# Patient Record
Sex: FEMALE | URBAN - METROPOLITAN AREA
[De-identification: names, ages, dates, MRNs, and addresses within clinical notes are randomized per-mention and may not be internally consistent; named-entity substitution may affect disease eponyms.]

---

## 2018-10-10 ENCOUNTER — VIRTUAL VISIT (OUTPATIENT)
Dept: FAMILY MEDICINE | Facility: OTHER | Age: 43
End: 2018-10-10

## 2018-10-11 NOTE — PROGRESS NOTES
"Date:   Clinician: Alonso Gotti  Clinician NPI: [id number]  Patient: Lilia Vaughn  Patient : 1975  Patient Address: [address] [place] [address], [place] [address]  Patient Phone: [phone]  Visit Protocol: Yeast infection  Patient Summary:  Lilia is a 43 year old ( : 1975 ) female who initiated a Visit for a presumed vaginal yeast infection. When asked the question \"Please sign me up to receive news, health information and promotions from [employer].\", Lilia responded \"Yes\".    Lilia began noticing vaginal discharge, vaginal pruritus, and perivulvar pruritus 0-5 days ago. She has a more than normal amount of thick, clear or white, non-odorous, chunky (like cottage cheese) discharge.   She denies having abdominal pain, perivulvar rash, and open sores. She also denies feeling feverish.   Lilia has a history of vaginal yeast infections. She has had zero (0) occurrences in the past year and the current symptoms are similar to previous yeast infections. She has used fluconazole (Diflucan) to treat previous yeast infections. 2 doses of fluconazole (Diflucan) has typically been needed for symptoms to resolve in the past.  She has attempted to treat her symptoms with anti-fungal cream for yeast infections and anti-fungal suppositories for yeast infections and has used the anti-fungal medication as directed. Anti-fungal medication used to treat symptoms as reported by the patient (free text): Monistat 3   She prefers a fluconazole (Diflucan) Pill.   She denies taking antibiotics in the past 2 weeks. She denies having a sexually transmitted disease.   She denies pregnancy and denies breastfeeding. She has menstruated in the past month.   She does NOT smoke or use smokeless tobacco.    MEDICATIONS: No current medications, ALLERGIES: NKDA  Clinician Response:  Dear Lilia,  Based on the information you have provided, you likely have a vaginal yeast infection which is a common infection of the vagina caused by " a fungus.  I am prescribing:   Fluconazole (Diflucan) 150 mg oral tablet. Take 1 tablet by mouth in a single dose. Repeat dose in 3 days if symptoms are still present. There are no refills with this prescription.  While you have yeast infection symptoms, do the following:      Avoid irritants such as scented bath products, tampons, pads, or vaginal sprays and powders.    Avoid douching.    Wear cotton underwear and if you are comfortable doing so, do not wear underwear to bed.    Avoid hot tubs and whirlpool spas.     Symptoms should improve with 1-2 days of treatment and resolve entirely in 5-7 days. However, there are other types of vaginal infections that have some of the same symptoms as a yeast infection. For this reason, please be seen in person if symptoms have not improved after 3 days or resolved after 10 days.   Diagnosis: Candida Vulvovaginitis  Diagnosis ICD: B37.3  Prescription: fluconazole (Diflucan) 150 mg oral tablet 2 tablet, 4 days supply. Take 1 tablet by mouth in a single dose, repeat dose in 3 days if symptoms are still present. Refills: 0, Refill as needed: no, Allow substitutions: yes  Pharmacy: [id number] - [phone] - [address] [place] [address]

## 2021-01-27 ENCOUNTER — VIRTUAL VISIT (OUTPATIENT)
Dept: FAMILY MEDICINE | Facility: OTHER | Age: 46
End: 2021-01-27

## 2021-01-27 NOTE — PROGRESS NOTES
"Date: 2021 13:45:25  Clinician: Sheyla Magaña  Clinician NPI: [id number]  Patient: Lilia Vaughn  Patient : 1975  Patient Address: [address] [place] [address]  Patient Phone: [phone]  Visit Protocol: Yeast infection  Patient Summary:  Lilia is a 45 year old ( : 1975 ) female who initiated a [facility] Visit for a presumed vaginal yeast infection. When asked the question \"Please sign me up to receive news, health information and promotions from [facility].\", Lilia responded \"No\".    Lilia began noticing vaginal burning, vaginal pruritus, vaginal discharge, and vaginal irritation 4-6 days ago.   Symptom details   Vaginal discharge: She has a more than normal amount of white, chunky (like cottage cheese) discharge. The discharge does not have a fishy smell.    She denies having blisters, open sores, and abdominal pain. She also denies feeling feverish.   She has attempted to treat her symptoms with anti-fungal cream for yeast infections. Anti-fungal medication used to treat symptoms as reported by the patient (free text): Monistat x3 days. Doesn't seem to be helping    Precipitating events  Lilia denies having a sexually transmitted disease.   Pertinent medical history  Lilia has a history of vaginal yeast infections. She has had zero (0) occurrences in the past year and the current symptoms are similar to previous yeast infections. She has used fluconazole (Diflucan) to treat previous yeast infections. 2 doses of fluconazole (Diflucan) has typically been needed for symptoms to resolve in the past.  She prefers a pill. She denies taking antibiotics in the past 2 weeks.   Lilia has not had bacterial vaginosis in the past.   Lilia does not have diabetes.   Lilia denies having immunosuppressive conditions (e.g., chemotherapy, HIV, organ transplant, long-term use of steroids or other immunosuppressive medications, splenectomy).   She does NOT smoke or use smokeless tobacco.   She denies pregnancy " and denies breastfeeding. She has menstruated in the past month.    Reason for repeat visit for the same protocol within 24 hours:  I selected the wrong answer on one of the questions.  See the History of referred by protocol and completed visits section for details on previous visits (visits currently in queue to be diagnosed will not appear in this section).    MEDICATIONS: No current medications, ALLERGIES: NKDA  Clinician Response:  Dear Lilia,   No alcohol while on flagyl  Take medication to completion    Diagnosis: Acute vaginitis  Diagnosis ICD: N76.0  Prescription: metronidazole (Flagyl) 500 mg oral tablet 14 tablet, 7 days supply. Take 1 tablet by mouth every 12 hours for 7 days. Refills: 0, Refill as needed: no, Allow substitutions: yes  Addendum created: January 27 13:56:05, 2021 created by: Sheyla Magaña NP body: I am sending 2 Diflucan tablets  to you. Please don't use flagyl. Thanks  Addendum created: January 27 13:55:26, 2021 created by: Sheyla Magaña NP body: 2 Diflucan 150 mg tablets.   To take 1 tablet and wait for 2 days before  taking the other if symptoms are still present.  Please delete flagyl. The diagnosis is yeast infection.